# Patient Record
Sex: FEMALE | Race: WHITE
[De-identification: names, ages, dates, MRNs, and addresses within clinical notes are randomized per-mention and may not be internally consistent; named-entity substitution may affect disease eponyms.]

---

## 2017-11-30 ENCOUNTER — HOSPITAL ENCOUNTER (EMERGENCY)
Dept: HOSPITAL 25 - UCCORT | Age: 32
Discharge: HOME | End: 2017-11-30
Payer: COMMERCIAL

## 2017-11-30 VITALS — DIASTOLIC BLOOD PRESSURE: 71 MMHG | SYSTOLIC BLOOD PRESSURE: 109 MMHG

## 2017-11-30 DIAGNOSIS — H92.01: ICD-10-CM

## 2017-11-30 DIAGNOSIS — Z88.1: ICD-10-CM

## 2017-11-30 DIAGNOSIS — R50.9: ICD-10-CM

## 2017-11-30 DIAGNOSIS — J02.9: Primary | ICD-10-CM

## 2017-11-30 PROCEDURE — G0463 HOSPITAL OUTPT CLINIC VISIT: HCPCS

## 2017-11-30 PROCEDURE — 87651 STREP A DNA AMP PROBE: CPT

## 2017-11-30 PROCEDURE — 99211 OFF/OP EST MAY X REQ PHY/QHP: CPT

## 2017-11-30 NOTE — UC
Throat Pain/Nasal [place] HPI





- HPI Summary


HPI Summary: 





THREE DAYS OF FEVER, SORE THROAT, RIGHT EAR PAIN. NO ABDOMINAL PAIN. NO RASH. 





- History of Current Complaint


Chief Complaint: UCGeneralIllness


Stated Complaint: SORE THROAT


Time Seen by Provider: 11/30/17 14:19


Hx Obtained From: Patient


Hx Last Menstrual Period: "I don't know. I'm not pregnant."


Onset/Duration: Gradual Onset, Lasting Days


Severity: Moderate


Pain Intensity: 6


Pain Scale Used: 0-10 Numeric


Cough: None


Associated Signs & Symptoms: Positive: Hoarseness, Fever





- Epiglottits Risk Factors


Epiglottis Risk Factors: Negative





- Allergies/Home Medications


Allergies/Adverse Reactions: 


 Allergies











Allergy/AdvReac Type Severity Reaction Status Date / Time


 


Clavulanic Acid Allergy  Hives Verified 11/30/17 14:12





[From Augmentin]     











Home Medications: 


 Home Medications





Cholecalciferol CAP/TAB(NF) [Vitamin D3 CAP/TAB (NF)] 5,000 unit PO DAILY 11/30/ 17 [History Confirmed 11/30/17]


Cyanocobalamin TAB* [Vitamin B12 TAB*] 500 mcg PO DAILY 11/30/17 [History 

Confirmed 11/30/17]


Multiple Vitamins W/ Minerals [Emergen-C Immune Plus] 1 fatou PO SEE INSTRUCTIONS 

PRN 11/30/17 [History Confirmed 11/30/17]











PMH/Surg Hx/FS Hx/Imm Hx


Previously Healthy: Yes





- Surgical History


Surgical History: None





- Family History


Known Family History: Positive: None





- Social History


Occupation: Employed Full-time


Lives: With Family


Alcohol Use: None


Substance Use Type: None


Smoking Status (MU): Never Smoked Tobacco





- Immunization History


Most Recent Influenza Vaccination: Not the 2017/2018 Season





Review of Systems


Constitutional: Fever


Skin: Negative


Eyes: Negative


ENT: Sore Throat, Ear Ache


Respiratory: Negative


Cardiovascular: Negative


Gastrointestinal: Negative


Genitourinary: Negative


Motor: Negative


Neurovascular: Negative


Musculoskeletal: Negative


Neurological: Negative


Psychological: Negative


Is Patient Immunocompromised?: No


All Other Systems Reviewed And Are Negative: Yes





Physical Exam


Triage Information Reviewed: Yes


Appearance: No Pain Distress, Well-Nourished, Ill-Appearing - MILDLY


Vital Signs: 


 Initial Vital Signs











Temp  99.6 F   11/30/17 14:10


 


Pulse  94   11/30/17 14:10


 


Resp  16   11/30/17 14:10


 


BP  109/71   11/30/17 14:10


 


Pulse Ox  99   11/30/17 14:10











Vital Signs Reviewed: Yes


Eye Exam: Normal


ENT: Positive: Hearing grossly normal, Pharyngeal erythema, TMs normal


Dental Exam: Normal


Neck exam: Normal


Neck: Positive: Supple, Nontender, No Lymphadenopathy


Respiratory Exam: Normal


Respiratory: Positive: Chest non-tender, Lungs clear, Normal breath sounds, No 

respiratory distress, No accessory muscle use


Cardiovascular Exam: Normal


Cardiovascular: Positive: RRR, No Murmur, Pulses Normal, Brisk Capillary Refill


Abdominal Exam: Normal


Abdomen Description: Positive: Nontender, No Organomegaly, Soft


Musculoskeletal Exam: Normal


Musculoskeletal: Positive: Strength Intact, ROM Intact


Neurological Exam: Normal


Psychological Exam: Normal


Skin Exam: Normal





Throat Pain/Nasal Course/Dx





- Differential Dx/Diagnosis


Differential Diagnosis/HQI/PQRI: Sinusitis, Tonsillitis, URI


Provider Diagnoses: PHARYNGITIS





Discharge





- Discharge Plan


Condition: Stable


Disposition: HOME


Patient Education Materials:  Pharyngitis (ED), Viral Syndrome (ED)


Forms:  *Work Release


Referrals: 


Cait Hahn MD [Primary Care Provider] -

## 2018-02-20 ENCOUNTER — HOSPITAL ENCOUNTER (EMERGENCY)
Dept: HOSPITAL 25 - UCCORT | Age: 33
Discharge: HOME | End: 2018-02-20
Payer: COMMERCIAL

## 2018-02-20 VITALS — DIASTOLIC BLOOD PRESSURE: 83 MMHG | SYSTOLIC BLOOD PRESSURE: 119 MMHG

## 2018-02-20 DIAGNOSIS — J11.1: Primary | ICD-10-CM

## 2018-02-20 PROCEDURE — 87651 STREP A DNA AMP PROBE: CPT

## 2018-02-20 PROCEDURE — 99212 OFFICE O/P EST SF 10 MIN: CPT

## 2018-02-20 PROCEDURE — G0463 HOSPITAL OUTPT CLINIC VISIT: HCPCS

## 2018-02-20 NOTE — UC
UC General HPI





- HPI Summary


HPI Summary: 





pt c/o sudden onset HA, sore throat, fatigue, nausea, cough and bodyaches. no cp

, sob, v/d/dysuria.





- History of Current Complaint


Chief Complaint: UCGeneralIllness


Stated Complaint: HEADACHE, ST, COUGH, CONGESTION


Time Seen by Provider: 02/20/18 13:47


Hx Obtained From: Patient


Hx Last Menstrual Period: 2/19/18


Onset/Duration: Sudden Onset


Timing: Constant


Pain Intensity: 0


Associated Signs & Symptoms: Positive: Cough, Diaphoresis, Fever, Headache, 

Nausea.  Negative: Chest Pain, Dysuria, SOB, Vomiting





- Allergy/Home Medications


Allergies/Adverse Reactions: 


 Allergies











Allergy/AdvReac Type Severity Reaction Status Date / Time


 


MS Clavulanic Acid Allergy  Hives Verified 02/20/18 13:40





[From Augmentin]     














PMH/Surg Hx/FS Hx/Imm Hx





- Additional Past Medical History


Additional PMH: 





HEALTHY


Previously Healthy: Yes





- Surgical History


Surgical History: None





- Family History


Known Family History: Positive: None





- Social History


Occupation: Employed Full-time


Lives: With Family


Alcohol Use: None


Substance Use Type: None


Smoking Status (MU): Never Smoked Tobacco





- Immunization History


Most Recent Influenza Vaccination: Not the 2017/2018 Season


Vaccination Up to Date: Yes





Review of Systems


Constitutional: Fever, Chills, Fatigue


Skin: Negative


Eyes: Negative


ENT: Sore Throat


Respiratory: Cough


Cardiovascular: Negative


Gastrointestinal: Nausea


Genitourinary: Negative


Motor: Negative


Neurovascular: Negative


Musculoskeletal: Negative


Neurological: Negative


Psychological: Negative


Is Patient Immunocompromised?: No


All Other Systems Reviewed And Are Negative: Yes





Physical Exam


Triage Information Reviewed: Yes


Appearance: Well-Appearing


Vital Signs: 


 Initial Vital Signs











Temp  99.9 F   02/20/18 13:35


 


Pulse  117   02/20/18 13:35


 


Resp  20   02/20/18 13:35


 


BP  119/83   02/20/18 13:35


 


Pulse Ox  99   02/20/18 13:35











Vital Signs Reviewed: Yes


Eyes: Positive: Conjunctiva Clear


ENT: Positive: Pharyngeal erythema, TMs normal, Uvula midline.  Negative: Nasal 

congestion, Nasal drainage, Tonsillar swelling, Tonsillar exudate, Trismus, 

Muffled voice, Hoarse voice, Sinus tenderness


Neck: Positive: Supple, Nontender, Enlarged Nodes @ - peritonsilar


Respiratory: Positive: Lungs clear, Normal breath sounds, No respiratory 

distress


Cardiovascular: Positive: No Murmur, Tachycardia - 100


Abdomen Description: Positive: Nontender, No Organomegaly, Soft


Bowel Sounds: Positive: Present


Musculoskeletal: Positive: No Edema


Neurological: Positive: Alert


Psychological: Positive: Age Appropriate Behavior


Skin Exam: Normal





Diagnostics





- Laboratory


Diagnostic Studies Completed/Ordered: rapid strep=neg





Course/Dx





- Course


Course Of Treatment: non toxic, not hypoxic. will r/o strep throat to ensure no 

indication for antibiotics. strep=negative, will tx for ARELIS as s/s's c/w 

influenza in community





- Differential Dx - Multi-Symptom


Provider Diagnoses: Influenza like illness





Discharge





- Discharge Plan


Condition: Stable


Disposition: HOME


Prescriptions: 


Oseltamivir Phosphate [Tamiflu] 75 mg PO BID 5 Days #10 capsule


Patient Education Materials:  Influenza (ED)


Forms:  *Work Release


Referrals: 


Cait Hahn MD [Primary Care Provider] - 5 Days

## 2018-09-07 ENCOUNTER — HOSPITAL ENCOUNTER (EMERGENCY)
Dept: HOSPITAL 25 - UCCORT | Age: 33
Discharge: HOME | End: 2018-09-07
Payer: COMMERCIAL

## 2018-09-07 VITALS — DIASTOLIC BLOOD PRESSURE: 65 MMHG | SYSTOLIC BLOOD PRESSURE: 120 MMHG

## 2018-09-07 DIAGNOSIS — R30.0: Primary | ICD-10-CM

## 2018-09-07 DIAGNOSIS — Z87.440: ICD-10-CM

## 2018-09-07 DIAGNOSIS — Z88.8: ICD-10-CM

## 2018-09-07 DIAGNOSIS — Z88.0: ICD-10-CM

## 2018-09-07 PROCEDURE — 99212 OFFICE O/P EST SF 10 MIN: CPT

## 2018-09-07 PROCEDURE — 87077 CULTURE AEROBIC IDENTIFY: CPT

## 2018-09-07 PROCEDURE — 87086 URINE CULTURE/COLONY COUNT: CPT

## 2018-09-07 PROCEDURE — 87186 SC STD MICRODIL/AGAR DIL: CPT

## 2018-09-07 PROCEDURE — G0463 HOSPITAL OUTPT CLINIC VISIT: HCPCS

## 2018-09-07 NOTE — UC
UC General HPI





- HPI Summary


HPI Summary: 





Patient is complaining of frequency, urgency and burning with urination for 

about a week.  She states she is pretty sure dizzy urinary tract infection she'

s had them before and this feels the same.  She has self treated with a AZO.  

She denies any associated abdominal pain, fever, flank pain and risk or concern 

for pelvic infection





- History of Current Complaint


Chief Complaint: UCGU


Stated Complaint: URINARY


Time Seen by Provider: 09/07/18 17:41


Hx Obtained From: Patient


Hx Last Menstrual Period: 8/28/18


Onset/Duration: Gradual Onset


Timing: Constant


Pain Intensity: 4


Alleviating: Nothing


Associated Signs & Symptoms: Positive: Dysuria.  Negative: Abdominal Pain, Fever





- Allergy/Home Medications


Allergies/Adverse Reactions: 


 Allergies











Allergy/AdvReac Type Severity Reaction Status Date / Time


 


amoxicillin [From Augmentin] Allergy  Hives Verified 09/07/18 17:30


 


clavulanic acid Allergy  Hives Verified 09/07/18 17:30





[From Augmentin]     














PMH/Surg Hx/FS Hx/Imm Hx





- Additional Past Medical History


Additional PMH: 





uti





- Surgical History


Surgical History: None





- Family History


Known Family History: Positive: None





- Social History


Occupation: Employed Full-time


Lives: With Family


Alcohol Use: Occasionally


Substance Use Type: None


Smoking Status (MU): Never Smoked Tobacco





- Immunization History


Most Recent Influenza Vaccination: Not the 2017/2018 Season


Vaccination Up to Date: Yes





Review of Systems


Constitutional: Negative


Skin: Negative


Eyes: Negative


ENT: Negative


Respiratory: Negative


Cardiovascular: Negative


Gastrointestinal: Negative


Genitourinary: Dysuria, Frequency, Urgency


Motor: Negative


Neurovascular: Negative


Musculoskeletal: Negative


Neurological: Negative


Psychological: Negative


Is Patient Immunocompromised?: No


All Other Systems Reviewed And Are Negative: Yes





Physical Exam


Triage Information Reviewed: Yes


Appearance: Well-Appearing


Vital Signs: 


 Initial Vital Signs











Temp  98.1 F   09/07/18 17:30


 


Pulse  80   09/07/18 17:30


 


Resp  14   09/07/18 17:30


 


BP  120/65   09/07/18 17:30


 


Pulse Ox  100   09/07/18 17:30











Vital Signs Reviewed: Yes


Eyes: Positive: Conjunctiva Clear


ENT: Positive: Normal ENT inspection


Neck: Positive: Supple, Nontender, No Lymphadenopathy


Respiratory: Positive: Lungs clear, Normal breath sounds


Cardiovascular: Positive: RRR, No Murmur


Abdomen Description: Positive: Nontender, No Organomegaly, Soft.  Negative: CVA 

Tenderness (R), CVA Tenderness (L), Distended, Guarding


Bowel Sounds: Positive: Present


Musculoskeletal: Positive: ROM Intact


Neurological: Positive: Alert


Psychological: Positive: Age Appropriate Behavior


Skin Exam: Normal





Diagnostics





- Laboratory


Diagnostic Studies Completed/Ordered: No UA due to Azo treatment.  Urine 

culture pending.





Course/Dx





- Course


Course Of Treatment: Nontoxic.  No acute abdomen.  Unable to check UA due to 

Azo treatment prior to arrival.  Urine culture pending.  We will treat 

presumptively for urinary tract infection.





- Differential Dx - Multi-Symptom


Provider Diagnoses: Dysuria





Discharge





- Sign-Out/Discharge


Documenting (check all that apply): Patient Departure


All imaging exams completed and their final reports reviewed: No Studies





- Discharge Plan


Condition: Stable


Disposition: HOME


Prescriptions: 


Nitrofurantoin Monohyd/M-Cryst [Macrobid 100 mg Capsule] 100 mg PO BID 5 Days #

10 cap


Patient Education Materials:  Dysuria (ED)


Referrals: 


Cait Hahn MD [Primary Care Provider] - 7 Days





- Billing Disposition and Condition


Condition: STABLE


Disposition: Home

## 2018-09-11 ENCOUNTER — HOSPITAL ENCOUNTER (EMERGENCY)
Dept: HOSPITAL 25 - UCCORT | Age: 33
Discharge: HOME | End: 2018-09-11
Payer: COMMERCIAL

## 2018-09-11 VITALS — DIASTOLIC BLOOD PRESSURE: 66 MMHG | SYSTOLIC BLOOD PRESSURE: 104 MMHG

## 2018-09-11 DIAGNOSIS — S50.811A: Primary | ICD-10-CM

## 2018-09-11 DIAGNOSIS — Z88.3: ICD-10-CM
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
## 2018-09-11 PROCEDURE — 99211 OFF/OP EST MAY X REQ PHY/QHP: CPT

## 2018-09-11 PROCEDURE — G0463 HOSPITAL OUTPT CLINIC VISIT: HCPCS
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
